# Patient Record
Sex: FEMALE
[De-identification: names, ages, dates, MRNs, and addresses within clinical notes are randomized per-mention and may not be internally consistent; named-entity substitution may affect disease eponyms.]

---

## 2017-06-26 PROBLEM — H93.13 TINNITUS, BILATERAL: Status: ACTIVE | Noted: 2017-06-26

## 2017-06-26 PROBLEM — H90.3 SENSORINEURAL HEARING LOSS (SNHL) OF BOTH EARS: Status: ACTIVE | Noted: 2017-06-26

## 2018-08-19 ENCOUNTER — HOSPITAL (OUTPATIENT)
Dept: OTHER | Age: 63
End: 2018-08-19
Attending: INTERNAL MEDICINE

## 2018-08-19 LAB
ALBUMIN SERPL-MCNC: 3.3 GM/DL (ref 3.6–5.1)
ALBUMIN/GLOB SERPL: 0.9 {RATIO} (ref 1–2.4)
ALP SERPL-CCNC: 88 UNIT/L (ref 45–117)
ALT SERPL-CCNC: 22 UNIT/L
ANALYZER ANC (IANC): NORMAL
ANION GAP SERPL CALC-SCNC: 9 MMOL/L (ref 10–20)
APTT PPP: 34 SECONDS (ref 22–30)
APTT PPP: ABNORMAL S
AST SERPL-CCNC: 18 UNIT/L
BASOPHILS # BLD: 0 THOUSAND/MCL (ref 0–0.3)
BASOPHILS NFR BLD: 1 %
BILIRUB SERPL-MCNC: 0.3 MG/DL (ref 0.2–1)
BUN SERPL-MCNC: 15 MG/DL (ref 6–20)
BUN/CREAT SERPL: 18 (ref 7–25)
CALCIUM SERPL-MCNC: 8.8 MG/DL (ref 8.4–10.2)
CHLORIDE: 105 MMOL/L (ref 98–107)
CO2 SERPL-SCNC: 28 MMOL/L (ref 21–32)
CREAT SERPL-MCNC: 0.83 MG/DL (ref 0.51–0.95)
DIFFERENTIAL METHOD BLD: NORMAL
EOSINOPHIL # BLD: 0.2 THOUSAND/MCL (ref 0.1–0.5)
EOSINOPHIL NFR BLD: 3 %
ERYTHROCYTE [DISTWIDTH] IN BLOOD: 14.2 % (ref 11–15)
GLOBULIN SER-MCNC: 3.8 GM/DL (ref 2–4)
GLUCOSE SERPL-MCNC: 82 MG/DL (ref 65–99)
HEMATOCRIT: 39.3 % (ref 36–46.5)
HGB BLD-MCNC: 12.7 GM/DL (ref 12–15.5)
INR PPP: 0.9
LYMPHOCYTES # BLD: 2.3 THOUSAND/MCL (ref 1–4)
LYMPHOCYTES NFR BLD: 29 %
MCH RBC QN AUTO: 30.2 PG (ref 26–34)
MCHC RBC AUTO-ENTMCNC: 32.3 GM/DL (ref 32–36.5)
MCV RBC AUTO: 93.6 FL (ref 78–100)
MONOCYTES # BLD: 0.7 THOUSAND/MCL (ref 0.3–0.9)
MONOCYTES NFR BLD: 9 %
NEUTROPHILS # BLD: 4.6 THOUSAND/MCL (ref 1.8–7.7)
NEUTROPHILS NFR BLD: 58 %
NEUTS SEG NFR BLD: NORMAL %
NRBC (NRBCRE): NORMAL
PLATELET # BLD: 227 THOUSAND/MCL (ref 140–450)
POTASSIUM SERPL-SCNC: 3.8 MMOL/L (ref 3.4–5.1)
PROT SERPL-MCNC: 7.1 GM/DL (ref 6.4–8.2)
PROTHROMBIN TIME: 9.7 SECONDS (ref 9.7–11.8)
PROTHROMBIN TIME: NORMAL
RBC # BLD: 4.2 MILLION/MCL (ref 4–5.2)
SODIUM SERPL-SCNC: 138 MMOL/L (ref 135–145)
WBC # BLD: 7.9 THOUSAND/MCL (ref 4.2–11)

## 2018-08-20 ENCOUNTER — CHARTING TRANS (OUTPATIENT)
Dept: OTHER | Age: 63
End: 2018-08-20

## 2018-08-20 ENCOUNTER — DIAGNOSTIC TRANS (OUTPATIENT)
Dept: OTHER | Age: 63
End: 2018-08-20

## 2018-08-20 LAB
ANALYZER ANC (IANC): NORMAL
ANION GAP SERPL CALC-SCNC: 12 MMOL/L (ref 10–20)
APTT PPP: 98 SECONDS (ref 22–30)
APTT PPP: >200 SECONDS (ref 22–30)
APTT PPP: ABNORMAL S
APTT PPP: ABNORMAL SECONDS (ref 22–30)
BASOPHILS # BLD: 0 THOUSAND/MCL (ref 0–0.3)
BASOPHILS NFR BLD: 1 %
BUN SERPL-MCNC: 16 MG/DL (ref 6–20)
BUN/CREAT SERPL: 21 (ref 7–25)
CALCIUM SERPL-MCNC: 8.6 MG/DL (ref 8.4–10.2)
CENTROMERE AB SER-ACNC: <0.2 AI (ref 0–0.9)
CHLORIDE: 108 MMOL/L (ref 98–107)
CHROMATIN AB SERPL-ACNC: <0.2 AI (ref 0–0.9)
CO2 SERPL-SCNC: 25 MMOL/L (ref 21–32)
CREAT SERPL-MCNC: 0.77 MG/DL (ref 0.51–0.95)
CRP SERPL-MCNC: 0.4 MG/DL
DIFFERENTIAL METHOD BLD: NORMAL
DSDNA AB SER IA-ACNC: 1 UNIT/ML
ENA JO1 IGG SER-ACNC: <0.2 AI (ref 0–0.9)
ENA RNP IGG SER IA-ACNC: <0.2 AI (ref 0–0.9)
ENA SCL70 IGG SER QL: 1.9 AI (ref 0–0.9)
ENA SM IGG SER IA-ACNC: <0.2 AI (ref 0–0.9)
ENA SM+RNP IGG SER IA-ACNC: <0.2 AI (ref 0–0.9)
ENA SS-A AB SER IA-ACNC: <0.2 AI (ref 0–0.9)
ENA SS-B IGG SER IA-ACNC: <0.2 AI (ref 0–0.9)
EOSINOPHIL # BLD: 0.3 THOUSAND/MCL (ref 0.1–0.5)
EOSINOPHIL NFR BLD: 4 %
ERYTHROCYTE [DISTWIDTH] IN BLOOD: 14.2 % (ref 11–15)
ERYTHROCYTE [SEDIMENTATION RATE] IN BLOOD BY WESTERGREN METHOD: 38 MM/HR (ref 0–20)
GLUCOSE SERPL-MCNC: 97 MG/DL (ref 65–99)
HEMATOCRIT: 37.4 % (ref 36–46.5)
HGB BLD-MCNC: 12 GM/DL (ref 12–15.5)
LYMPHOCYTES # BLD: 2.9 THOUSAND/MCL (ref 1–4)
LYMPHOCYTES NFR BLD: 36 %
MCH RBC QN AUTO: 29.9 PG (ref 26–34)
MCHC RBC AUTO-ENTMCNC: 32.1 GM/DL (ref 32–36.5)
MCV RBC AUTO: 93.3 FL (ref 78–100)
MONOCYTES # BLD: 0.8 THOUSAND/MCL (ref 0.3–0.9)
MONOCYTES NFR BLD: 10 %
NEUTROPHILS # BLD: 4.1 THOUSAND/MCL (ref 1.8–7.7)
NEUTROPHILS NFR BLD: 49 %
NEUTS SEG NFR BLD: NORMAL %
NRBC (NRBCRE): NORMAL
PLATELET # BLD: 203 THOUSAND/MCL (ref 140–450)
POTASSIUM SERPL-SCNC: 3.5 MMOL/L (ref 3.4–5.1)
RBC # BLD: 4.01 MILLION/MCL (ref 4–5.2)
RHEUMATOID FACT SER NEPH-ACNC: <10 UNIT/ML
RIBOSOMAL P IGG SER-ACNC: <0.2 AI (ref 0–0.9)
SODIUM SERPL-SCNC: 141 MMOL/L (ref 135–145)
WBC # BLD: 8.2 THOUSAND/MCL (ref 4.2–11)

## 2018-09-06 ENCOUNTER — DIAGNOSTIC TRANS (OUTPATIENT)
Dept: OTHER | Age: 63
End: 2018-09-06

## 2021-07-23 ENCOUNTER — HOSPITAL ENCOUNTER (OUTPATIENT)
Facility: CLINIC | Age: 66
Setting detail: OBSERVATION
Discharge: HOME | End: 2021-07-25
Attending: EMERGENCY MEDICINE | Admitting: EMERGENCY MEDICINE
Payer: COMMERCIAL

## 2021-07-23 DIAGNOSIS — I48.91 ATRIAL FIBRILLATION WITH RAPID VENTRICULAR RESPONSE (H): ICD-10-CM

## 2021-07-23 LAB
ALBUMIN SERPL-MCNC: 3.4 G/DL (ref 3.4–5)
ALP SERPL-CCNC: 86 U/L (ref 40–150)
ALT SERPL W P-5'-P-CCNC: 22 U/L (ref 0–50)
ANION GAP SERPL CALCULATED.3IONS-SCNC: 4 MMOL/L (ref 3–14)
AST SERPL W P-5'-P-CCNC: 12 U/L (ref 0–45)
BASOPHILS # BLD AUTO: 0.1 10E3/UL (ref 0–0.2)
BASOPHILS NFR BLD AUTO: 1 %
BILIRUB SERPL-MCNC: 0.5 MG/DL (ref 0.2–1.3)
BUN SERPL-MCNC: 17 MG/DL (ref 7–30)
CALCIUM SERPL-MCNC: 9.1 MG/DL (ref 8.5–10.1)
CHLORIDE BLD-SCNC: 109 MMOL/L (ref 94–109)
CO2 SERPL-SCNC: 26 MMOL/L (ref 20–32)
CREAT SERPL-MCNC: 0.79 MG/DL (ref 0.52–1.04)
EOSINOPHIL # BLD AUTO: 0.3 10E3/UL (ref 0–0.7)
EOSINOPHIL NFR BLD AUTO: 4 %
ERYTHROCYTE [DISTWIDTH] IN BLOOD BY AUTOMATED COUNT: 13.8 % (ref 10–15)
GFR SERPL CREATININE-BSD FRML MDRD: 80 ML/MIN/1.73M2
GLUCOSE BLD-MCNC: 100 MG/DL (ref 70–99)
HCT VFR BLD AUTO: 43.1 % (ref 35–47)
HGB BLD-MCNC: 13.9 G/DL (ref 11.7–15.7)
IMM GRANULOCYTES # BLD: 0 10E3/UL
IMM GRANULOCYTES NFR BLD: 0 %
LYMPHOCYTES # BLD AUTO: 2.2 10E3/UL (ref 0.8–5.3)
LYMPHOCYTES NFR BLD AUTO: 29 %
MAGNESIUM SERPL-MCNC: 2.3 MG/DL (ref 1.6–2.3)
MCH RBC QN AUTO: 29.6 PG (ref 26.5–33)
MCHC RBC AUTO-ENTMCNC: 32.3 G/DL (ref 31.5–36.5)
MCV RBC AUTO: 92 FL (ref 78–100)
MONOCYTES # BLD AUTO: 0.9 10E3/UL (ref 0–1.3)
MONOCYTES NFR BLD AUTO: 12 %
NEUTROPHILS # BLD AUTO: 4 10E3/UL (ref 1.6–8.3)
NEUTROPHILS NFR BLD AUTO: 54 %
NRBC # BLD AUTO: 0 10E3/UL
NRBC BLD AUTO-RTO: 0 /100
PLATELET # BLD AUTO: 258 10E3/UL (ref 150–450)
POTASSIUM BLD-SCNC: 4.4 MMOL/L (ref 3.4–5.3)
PROT SERPL-MCNC: 7.5 G/DL (ref 6.8–8.8)
RBC # BLD AUTO: 4.7 10E6/UL (ref 3.8–5.2)
SARS-COV-2 RNA RESP QL NAA+PROBE: NEGATIVE
SODIUM SERPL-SCNC: 139 MMOL/L (ref 133–144)
TROPONIN I SERPL-MCNC: <0.015 UG/L (ref 0–0.04)
TROPONIN I SERPL-MCNC: <0.015 UG/L (ref 0–0.04)
TSH SERPL DL<=0.005 MIU/L-ACNC: 1.08 MU/L (ref 0.4–4)
UFH PPP CHRO-ACNC: 0.77 IU/ML
WBC # BLD AUTO: 7.5 10E3/UL (ref 4–11)

## 2021-07-23 PROCEDURE — 85025 COMPLETE CBC W/AUTO DIFF WBC: CPT | Performed by: EMERGENCY MEDICINE

## 2021-07-23 PROCEDURE — 36415 COLL VENOUS BLD VENIPUNCTURE: CPT | Performed by: HOSPITALIST

## 2021-07-23 PROCEDURE — 85520 HEPARIN ASSAY: CPT | Performed by: HOSPITALIST

## 2021-07-23 PROCEDURE — 36415 COLL VENOUS BLD VENIPUNCTURE: CPT | Performed by: EMERGENCY MEDICINE

## 2021-07-23 PROCEDURE — 83735 ASSAY OF MAGNESIUM: CPT | Performed by: HOSPITALIST

## 2021-07-23 PROCEDURE — 84484 ASSAY OF TROPONIN QUANT: CPT | Performed by: HOSPITALIST

## 2021-07-23 PROCEDURE — 250N000009 HC RX 250: Performed by: EMERGENCY MEDICINE

## 2021-07-23 PROCEDURE — 96376 TX/PRO/DX INJ SAME DRUG ADON: CPT

## 2021-07-23 PROCEDURE — 96374 THER/PROPH/DIAG INJ IV PUSH: CPT

## 2021-07-23 PROCEDURE — 80053 COMPREHEN METABOLIC PANEL: CPT | Performed by: EMERGENCY MEDICINE

## 2021-07-23 PROCEDURE — G0378 HOSPITAL OBSERVATION PER HR: HCPCS

## 2021-07-23 PROCEDURE — 250N000013 HC RX MED GY IP 250 OP 250 PS 637: Performed by: HOSPITALIST

## 2021-07-23 PROCEDURE — 84484 ASSAY OF TROPONIN QUANT: CPT | Performed by: EMERGENCY MEDICINE

## 2021-07-23 PROCEDURE — 258N000003 HC RX IP 258 OP 636: Performed by: EMERGENCY MEDICINE

## 2021-07-23 PROCEDURE — 87635 SARS-COV-2 COVID-19 AMP PRB: CPT | Performed by: EMERGENCY MEDICINE

## 2021-07-23 PROCEDURE — 99285 EMERGENCY DEPT VISIT HI MDM: CPT | Mod: 25

## 2021-07-23 PROCEDURE — 250N000011 HC RX IP 250 OP 636: Performed by: EMERGENCY MEDICINE

## 2021-07-23 PROCEDURE — 96375 TX/PRO/DX INJ NEW DRUG ADDON: CPT

## 2021-07-23 PROCEDURE — 96368 THER/DIAG CONCURRENT INF: CPT

## 2021-07-23 PROCEDURE — 84443 ASSAY THYROID STIM HORMONE: CPT | Performed by: HOSPITALIST

## 2021-07-23 PROCEDURE — 99220 PR INITIAL OBSERVATION CARE,LEVEL III: CPT | Performed by: HOSPITALIST

## 2021-07-23 PROCEDURE — 93005 ELECTROCARDIOGRAM TRACING: CPT

## 2021-07-23 PROCEDURE — 96366 THER/PROPH/DIAG IV INF ADDON: CPT

## 2021-07-23 PROCEDURE — C9803 HOPD COVID-19 SPEC COLLECT: HCPCS

## 2021-07-23 PROCEDURE — 96365 THER/PROPH/DIAG IV INF INIT: CPT

## 2021-07-23 RX ORDER — CETIRIZINE HYDROCHLORIDE 10 MG/1
10 TABLET ORAL DAILY
COMMUNITY

## 2021-07-23 RX ORDER — NALOXONE HYDROCHLORIDE 0.4 MG/ML
0.2 INJECTION, SOLUTION INTRAMUSCULAR; INTRAVENOUS; SUBCUTANEOUS
Status: DISCONTINUED | OUTPATIENT
Start: 2021-07-23 | End: 2021-07-25 | Stop reason: HOSPADM

## 2021-07-23 RX ORDER — CHLORAL HYDRATE 500 MG
1 CAPSULE ORAL DAILY
COMMUNITY

## 2021-07-23 RX ORDER — POLYETHYLENE GLYCOL 3350 17 G/17G
17 POWDER, FOR SOLUTION ORAL DAILY PRN
Status: DISCONTINUED | OUTPATIENT
Start: 2021-07-23 | End: 2021-07-25 | Stop reason: HOSPADM

## 2021-07-23 RX ORDER — ASPIRIN 325 MG/1
325 TABLET, FILM COATED ORAL DAILY
Status: DISCONTINUED | OUTPATIENT
Start: 2021-07-23 | End: 2021-07-24

## 2021-07-23 RX ORDER — ACETAMINOPHEN 650 MG/1
650 SUPPOSITORY RECTAL EVERY 6 HOURS PRN
Status: DISCONTINUED | OUTPATIENT
Start: 2021-07-23 | End: 2021-07-25 | Stop reason: HOSPADM

## 2021-07-23 RX ORDER — NALOXONE HYDROCHLORIDE 0.4 MG/ML
0.4 INJECTION, SOLUTION INTRAMUSCULAR; INTRAVENOUS; SUBCUTANEOUS
Status: DISCONTINUED | OUTPATIENT
Start: 2021-07-23 | End: 2021-07-25 | Stop reason: HOSPADM

## 2021-07-23 RX ORDER — ONDANSETRON 4 MG/1
4 TABLET, ORALLY DISINTEGRATING ORAL EVERY 6 HOURS PRN
Status: DISCONTINUED | OUTPATIENT
Start: 2021-07-23 | End: 2021-07-25 | Stop reason: HOSPADM

## 2021-07-23 RX ORDER — SODIUM CHLORIDE 9 MG/ML
INJECTION, SOLUTION INTRAVENOUS CONTINUOUS
Status: DISCONTINUED | OUTPATIENT
Start: 2021-07-23 | End: 2021-07-23

## 2021-07-23 RX ORDER — HEPARIN SODIUM 10000 [USP'U]/100ML
0-5000 INJECTION, SOLUTION INTRAVENOUS CONTINUOUS
Status: DISCONTINUED | OUTPATIENT
Start: 2021-07-23 | End: 2021-07-23

## 2021-07-23 RX ORDER — METOPROLOL TARTRATE 1 MG/ML
5 INJECTION, SOLUTION INTRAVENOUS
Status: COMPLETED | OUTPATIENT
Start: 2021-07-23 | End: 2021-07-23

## 2021-07-23 RX ORDER — OMEPRAZOLE 40 MG/1
40 CAPSULE, DELAYED RELEASE ORAL DAILY
COMMUNITY

## 2021-07-23 RX ORDER — ACETAMINOPHEN 325 MG/1
650 TABLET ORAL EVERY 6 HOURS PRN
Status: DISCONTINUED | OUTPATIENT
Start: 2021-07-23 | End: 2021-07-25 | Stop reason: HOSPADM

## 2021-07-23 RX ORDER — OXYCODONE HYDROCHLORIDE 5 MG/1
5 TABLET ORAL EVERY 4 HOURS PRN
Status: DISCONTINUED | OUTPATIENT
Start: 2021-07-23 | End: 2021-07-25 | Stop reason: HOSPADM

## 2021-07-23 RX ORDER — ASPIRIN 325 MG/1
325 TABLET, FILM COATED ORAL DAILY
Status: ON HOLD | COMMUNITY
End: 2021-07-25

## 2021-07-23 RX ORDER — HEPARIN SODIUM 10000 [USP'U]/100ML
0-5000 INJECTION, SOLUTION INTRAVENOUS CONTINUOUS
Status: DISCONTINUED | OUTPATIENT
Start: 2021-07-23 | End: 2021-07-24

## 2021-07-23 RX ORDER — ONDANSETRON 2 MG/ML
4 INJECTION INTRAMUSCULAR; INTRAVENOUS EVERY 6 HOURS PRN
Status: DISCONTINUED | OUTPATIENT
Start: 2021-07-23 | End: 2021-07-25 | Stop reason: HOSPADM

## 2021-07-23 RX ORDER — SPIRONOLACTONE 50 MG/1
50 TABLET, FILM COATED ORAL DAILY
Status: ON HOLD | COMMUNITY
End: 2021-07-25

## 2021-07-23 RX ADMIN — METOPROLOL TARTRATE 5 MG: 5 INJECTION INTRAVENOUS at 12:55

## 2021-07-23 RX ADMIN — HEPARIN SODIUM 1200 UNITS/HR: 10000 INJECTION, SOLUTION INTRAVENOUS at 14:32

## 2021-07-23 RX ADMIN — SODIUM CHLORIDE 1000 ML: 9 INJECTION, SOLUTION INTRAVENOUS at 11:47

## 2021-07-23 RX ADMIN — ASPIRIN 325 MG: 325 TABLET, FILM COATED ORAL at 20:27

## 2021-07-23 RX ADMIN — DILTIAZEM HYDROCHLORIDE 5 MG/HR: 5 INJECTION INTRAVENOUS at 14:44

## 2021-07-23 RX ADMIN — METOPROLOL TARTRATE 5 MG: 5 INJECTION INTRAVENOUS at 11:56

## 2021-07-23 RX ADMIN — METOPROLOL TARTRATE 5 MG: 5 INJECTION INTRAVENOUS at 12:35

## 2021-07-23 RX ADMIN — OMEPRAZOLE 40 MG: 20 CAPSULE, DELAYED RELEASE ORAL at 20:27

## 2021-07-23 ASSESSMENT — ENCOUNTER SYMPTOMS: PALPITATIONS: 1

## 2021-07-23 NOTE — ED NOTES
[facility]  ED Nurse Handoff Report    ED Chief complaint: Palpitations      ED Diagnosis:   Final diagnoses:   Atrial fibrillation with rapid ventricular response (H)       Code Status: see prior    Allergies:   Allergies   Allergen Reactions     Penicillins        Patient Story: Pt here visiting family from [place] and developed palpitations this AM. Pt states she has a hx of afib.   Focused Assessment:  Aox4. Initial Hr 145 and now 108 after IV metoprolol x3. Dilt GTT will be started. Trop Neg. EKG Neg for STEMI. Afib on monitor.    Treatments and/or interventions provided: SEE MAR, HR lower however still in AFIB.  Patient's response to treatments and/or interventions:     To be done/followed up on inpatient unit: n/a    Does this patient have any cognitive concerns?:no    Activity level - Baseline/Home:  Independent  Activity Level - Current:   Independent    Patient's Preferred language: English   Needed?: No    Isolation: None  Infection: Not Applicable  Patient tested for COVID 19 prior to admission: YES  Bariatric?: No    Vital Signs:   Vitals:    07/23/21 1143 07/23/21 1229 07/23/21 1248 07/23/21 1337   BP: (!) 143/119 134/75 (!) 126/96    Pulse: (!) 124 (!) 124 118    Resp: 19 14 17    Temp:       TempSrc:       SpO2: 98% 97% 97%    Weight:    109.7 kg (241 lb 12.8 oz)       Cardiac Rhythm:     Was the PSS-3 completed:   Yes  What interventions are required if any?               Family Comments: Spouse at bedside  OBS brochure/video discussed/provided to patient/family: N/A              Name of person given brochure if not patient: na              Relationship to patient: na    For the majority of the shift this patient's behavior was Green.   Behavioral interventions performed were na.    ED NURSE PHONE NUMBER: [id number]

## 2021-07-23 NOTE — ED TRIAGE NOTES
Very irregular heart beat for the last couple of hours. Hx of afib but usually only last a couple of seconds.

## 2021-07-23 NOTE — H&P
[facility]    History and Physical  Hospitalist    Ronel Li MRN# [id number]   Age: 65 year old YOB: 1955     Date of Admission:  7/23/2021    Primary care provider: No Ref-Primary, Physician          Assessment and Plan:     Ronel Li is a 65 year old  female with medical history of paroxysmal atrial fibrillation not on anticoagulation, asymptomatic SVT on Holter, lupus anticoagulant, statin intolerance, GERD presented to the ED with the palpitation.    Symptomatic paroxysmal atrial fibrillation with RVR  Paroxysmal atrial fibrillation not on anticoagulation..  History of SVT.  Patient reports on and off palpitations from her atrial fibrillation, approximately 1-2 times a month.  This morning was working out, noted that her Apple Watch indicated a heart rate that was going up and down.  Associated palpitations prompting her to seek medical attention. Reports from [place], in [facility] to celebrate her mother's 90th birthday.   Has Established cardiac care in [place]. Holter 10/25/2018 - Atrial runs were noted in the form of supraventricular tachycardia. Echo 10/25/2018 -EF 63%.  Trivial to mild mitral valve regurgitation.  ED afebrile, blood pressure 125/90, heart rate 128.  Irregularly irregular heartbeat.  WBC 7.5, glucose 100, troponin undetectable.  EKG A. fib with RVR, heart rate 128.  Admit to observation unit.  Telemetry monitoring.  Trend troponin.  Echocardiogram ordered.  Will start on IV Cardizem drip, received 2 doses of IV metoprolol in ED.  Start on IV heparin drip.  Continue PTA aspirin 325 mg oral daily.  Check TSH, magnesium levels.  Keep potassium around 4, magnesium around 2.  Cardiology consult requested.    Hypertension  Hold PTA spironolactone.  On IV Cardizem drip as above.    Hyperlipidemia.  LDL of 144 in April 2021 on review of records in Care Everywhere.  History of intolerance to statin therapy.  Defer care to PCP, primary  cardiologist.  Consider lifestyle modification, hold fish oil supplements while under observation status.    Moderate - severe alcohol use.   Does admit to drinking a little more than her usual since the beginning of the pandemic.  Reports drinks 1 to 2 glasses of wine every day, 4 to 5 glasses of wine over the weekend.  Last night had drank a full bottle of wine.   Discussed to consider cutting down to 1 alcoholic drink per day.  Is motivated to cut down     History of lupus anticoagulant, scleroderma antibody positive.  Follows up with rheumatologist in [place].  No acute issues  Continue PTA aspirin 325 mg oral daily.    Gastroesophageal reflux disease.  Continue PTA omeprazole.    Obesity with a BMI greater than 30.  Consider lifestyle modification with diet and exercise as able to.  Consider sleep studies as outpatient.    Rule Out COVID-19 infection  Follow-up COVID-19 PCR test result    DVT Prophylaxis: SCD, on IV heparin.  Code Status: Full code, discussed with patient.     Disposition: Expected discharge in 1 to 2 days pending clinical improvement.  More than 60% of time spent in direct patient care, care coordination, patient counseling, and formalizing plan of care. Discussed with patient and ED team.     Kassandra Mccauley MD          Chief Complaint:     History is obtained from patient, review of medical records.    Ronel Li is a 65 year old  female with medical history of paroxysmal atrial fibrillation not on anticoagulation, asymptomatic SVT on Holter, lupus anticoagulant, statin intolerance, GERD presented to the ED with the palpitation.    Patient reports on and off palpitations from her atrial fibrillation, approximately 1-2 times a month.  This morning was working out, noted that her Apple Watch indicated a heart rate that was going up and down.  Associated palpitations prompting her to seek medical attention. Reports from [place], in town to celebrate her mother's 90th birthday.   Denies  any chest pain or shortness of breath.  Denies any cough or wheezing.  Denies any fever or chills.  Denies any headache or dizziness.  Denies any tingling or numbness.  Denies any focal weakness.  Denies any abdominal pain or diarrhea or constipation.  Denies any coughing up blood or blood in the stools or blood in the urine.  Does admit to drinking a little more than her usual since the beginning of the pandemic.  Reports drinks 1 to 2 glasses of wine every day, 4 to 5 glasses of wine over the weekend.  Last night had drank a full bottle of wine.   ED afebrile, blood pressure 125/90, heart rate 128.  Irregularly irregular heartbeat.  WBC 7.5, glucose 100, troponin undetectable.  EKG A. fib with RVR, heart rate 128.         Review of Systems:     GENERAL:  no fever or chills  EENT:  no difficulty swallowing, no hearing difficulty  PULMONARY: No shortness of breath, no cough, no wheezing  CARDIAC: no chest pain  GI: No abdominal pain, nausea, vomiting, diarrhea, constipation, black or bloody stools  : No burning/pain with urination  NEURO: No significant headaches, no dizziness  ENDOCRINE: No excessive thirst, no excessive bruising.  MUSCULOSKELETAL: No joint pain  SKIN: No skin rashes  PSYCHIATRY no new anxiety    Medical History:   paroxysmal atrial fibrillation not on anticoagulation, asymptomatic SVT on Holter, lupus anticoagulant, statin intolerance, GERD    Surgical History:   Surgical history reviewed, no pertinent surgical history to [facility].         Social History:      Former smoker.  Daily alcohol use.Does admit to drinking a little more than her usual since the beginning of the pandemic.  Reports drinks 1 to 2 glasses of wine every day, 4 to 5 glasses of wine over the weekend.  Last night had drank a full bottle of wine.   , lives at home with her .  Working remotely.         Family History:     Family history reviewed, no pertinent family history to [facility].         Allergies:     Allergies    Allergen Reactions     Penicillins              Medications:   Home medications reviewed.         Physical Exam      Admission Weight: 109.7 kg (241 lb 12.8 oz)  Current Weight: 109.7 kg (241 lb 12.8 oz)    Vital Signs with Ranges  Temp:  [98  F (36.7  C)] 98  F (36.7  C)  Pulse:  [118-128] 118  Resp:  [14-19] 17  BP: (125-143)/() 126/96  SpO2:  [97 %-98 %] 97 %  No intake or output data in the 24 hours ending 07/23/21 1348    PHYSICAL EXAM  GENERAL: Patient is in no distress. Alert and oriented.  HEENT: Oropharynx pink, moist.   HEART: irregular rate and rhythm. S1S2. No murmurs tachycardia   LUNGS: Clear to auscultation bilaterally. No expiratory wheeze.  Respirations unlabored  ABDOMEN: Soft, no abdominal tenderness, bowel sounds heard   NEURO: moving all extremities, no focal weakness.  EXTREMITIES: No pedal edema. 2+ peripheral pulses.  SKIN: Warm, dry. No rash or bruising.  PSYCHIATRY Cooperative         Data:   All new lab and imaging data was reviewed.

## 2021-07-23 NOTE — ED PROVIDER NOTES
History   Chief Complaint:  Palpitations       HPI   Ronel Li is a 65 year old female with history of Atrial Fibrillation who presents with palpitations since this morning. The patient work up this morning feeling her heart racing. Her apple watch indicated that her heart rate was going up and down since 0600. She has been diagnosed with A fib and has had a heart murmur ever since she was little. She felt thirst last night but drank a bottle of wine instead of water. She drinks alcohol on a regular basis. She is visiting her mother and is from [place].     Review of Systems   Cardiovascular: Positive for palpitations.   All other systems reviewed and are negative.        Allergies:  Penicillins    Medications:  Zyrtec  Spiralactone  Prilosec  Asprin 325  Fish oil    Past Medical History:    Atrial fibrillation  Heart murmur    Social History:  The patient presents with .   From [place].     Physical Exam     Patient Vitals for the past 24 hrs:   BP Temp Temp src Pulse Resp SpO2   07/23/21 1248 (!) 126/96 -- -- 118 17 97 %   07/23/21 1229 134/75 -- -- (!) 124 14 97 %   07/23/21 1143 (!) 143/119 -- -- (!) 124 19 98 %   07/23/21 1101 -- -- -- (!) 128 -- --   07/23/21 1053 (!) 125/90 98  F (36.7  C) Temporal -- 16 98 %       Physical Exam  GENERAL: well developed, pleasant  HEAD: atraumatic  EYES: pupils reactive, extraocular muscles intact, conjunctivae normal  ENT:  mucus membranes moist  NECK:  trachea midline, normal range of motion  RESPIRATORY: no tachypnea, breath sounds clear to auscultation   CVS: normal S1/S2, no murmurs, intact distal pulses Irregular heartrate  ABDOMEN: soft, nontender, nondistention  MUSCULOSKELETAL: no deformities  SKIN: warm and dry, no acute rashes or ulceration  NEURO: GCS 15, cranial nerves intact, alert and oriented x3  PSYCH:  Mood/affect normal    Emergency Department Course   ECG  ECG taken at 1059, ECG read at 1102  Atrial fibrillation with rapid ventricular  response  Possible anterior infarct, age undetermined  ST and T wave abnormality, consider lateral ischemia   Rate 128 bpm. IL interval * ms. QRS duration 114 ms. QT/QTc 330/481 ms. P-R-T axes * -1 124.     Laboratory:   CBC: WBC 7.5, HGB 13.9,      CMP: Glucose: 100 (H) o/w WNL (Creatinine 0.79)     Troponin (Collected 1150): <0.015    Emergency Department Course:    Reviewed:  I reviewed nursing notes, vitals, past medical history and care everywhere    Assessments:  1117 I obtained history and examined the patient as noted above.     1328 I rechecked the patient and explained findings.     Consults:   1340 I spoke with Dr. Mccauley, hospitalist, who agreed to admit the patient.    1348 I spoke with Cardiology, regarding the patient.    Interventions:  1147 Bolus 1,000 mL IV    1235 Lopressor 5 mg IV    1156 Lopressor 5 mg IV    Disposition:  The patient was admitted to the hospital under the care of Dr. Mccauley.       Impression & Plan     Medical Decision Making:    Patient presents with fast heart rate and history of paroxysmal atrial fibrillation.  She is not anticoagulated nor is she on rate control prevention.  She notes since the pandemic she has been drinking more alcohol.  She had full bottle of wine last night.  She is visiting from out of town.  She notes she has been in and out of A. fib or what she felt like she has been in and out this past month may be 2 or 3 times.  Normally she goes out of it but this time she persisted.  Patient takes full dose aspirin.  Patient was given IV fluids and metoprolol x3.  She still having ongoing A. fib.  Cardizem drip and heparin was started.  This figure the hospitalist regarding admission who requested consult with cardiology regarding possible cardioversion.  Cardioversion was not suggested.  Patient be admitted for ongoing work-up and treatment.      Diagnosis:    ICD-10-CM    1. Atrial fibrillation with rapid ventricular response (H)  I48.91         Scribe Disclosure:  I, Reese Joslyn, am serving as a scribe at 11:19 AM on 7/23/2021 to document services personally performed by Jeff Mancia MD based on my observations and the provider's statements to me.            Jeff Mancia MD  07/23/21 3620

## 2021-07-23 NOTE — PHARMACY-ADMISSION MEDICATION HISTORY
Pharmacy Medication History  Admission medication history interview status for the 7/23/2021  admission is complete. See EPIC admission navigator for prior to admission medications     Location of Interview: Patient room  Medication history sources: Patient, Patient's home med list and Care Everywhere    Significant changes made to the medication list:  Added entire med list    In the past week, patient estimated taking medication this percent of the time: greater than 90%    Additional medication history information:   None    Medication reconciliation completed by provider prior to medication history? No    Time spent in this activity: 15 mins    Prior to Admission medications    Medication Sig Last Dose Taking? Auth Provider   aspirin - buffered (ASCRIPTIN) 325 MG TABS tablet Take 325 mg by mouth daily 7/22/2021 at AM Yes Unknown, Entered By History   cetirizine (ZYRTEC) 10 MG tablet Take 10 mg by mouth daily 7/22/2021 at AM Yes Unknown, Entered By History   fish oil-omega-3 fatty acids 1000 MG capsule Take 1 g by mouth daily 7/22/2021 at AM Yes Unknown, Entered By History   omeprazole (PRILOSEC) 40 MG DR capsule Take 40 mg by mouth daily 7/22/2021 at AM Yes Unknown, Entered By History   spironolactone (ALDACTONE) 50 MG tablet Take 50 mg by mouth daily 7/22/2021 at AM Yes Unknown, Entered By History       The information provided in this note is only as accurate as the sources available at the time of update(s)     Xiao Serrano, RadhaD

## 2021-07-24 ENCOUNTER — APPOINTMENT (OUTPATIENT)
Dept: CT IMAGING | Facility: CLINIC | Age: 66
End: 2021-07-24
Attending: NURSE PRACTITIONER
Payer: COMMERCIAL

## 2021-07-24 ENCOUNTER — APPOINTMENT (OUTPATIENT)
Dept: CARDIOLOGY | Facility: CLINIC | Age: 66
End: 2021-07-24
Attending: HOSPITALIST
Payer: COMMERCIAL

## 2021-07-24 LAB
ANION GAP SERPL CALCULATED.3IONS-SCNC: 6 MMOL/L (ref 3–14)
ATRIAL RATE - MUSE: 107 BPM
BASOPHILS # BLD AUTO: 0.1 10E3/UL (ref 0–0.2)
BASOPHILS NFR BLD AUTO: 1 %
BUN SERPL-MCNC: 13 MG/DL (ref 7–30)
CALCIUM SERPL-MCNC: 9.2 MG/DL (ref 8.5–10.1)
CHLORIDE BLD-SCNC: 111 MMOL/L (ref 94–109)
CO2 SERPL-SCNC: 23 MMOL/L (ref 20–32)
CREAT SERPL-MCNC: 0.76 MG/DL (ref 0.52–1.04)
D DIMER PPP FEU-MCNC: <0.27 UG/ML FEU (ref 0–0.5)
DIASTOLIC BLOOD PRESSURE - MUSE: NORMAL MMHG
EOSINOPHIL # BLD AUTO: 0.3 10E3/UL (ref 0–0.7)
EOSINOPHIL NFR BLD AUTO: 4 %
ERYTHROCYTE [DISTWIDTH] IN BLOOD BY AUTOMATED COUNT: 14.1 % (ref 10–15)
GFR SERPL CREATININE-BSD FRML MDRD: 84 ML/MIN/1.73M2
GLUCOSE BLD-MCNC: 99 MG/DL (ref 70–99)
HCT VFR BLD AUTO: 42.6 % (ref 35–47)
HGB BLD-MCNC: 14.1 G/DL (ref 11.7–15.7)
HOLD SPECIMEN: NORMAL
IMM GRANULOCYTES # BLD: 0 10E3/UL
IMM GRANULOCYTES NFR BLD: 0 %
INTERPRETATION ECG - MUSE: NORMAL
LVEF ECHO: NORMAL
LYMPHOCYTES # BLD AUTO: 2.4 10E3/UL (ref 0.8–5.3)
LYMPHOCYTES NFR BLD AUTO: 25 %
MCH RBC QN AUTO: 30.3 PG (ref 26.5–33)
MCHC RBC AUTO-ENTMCNC: 33.1 G/DL (ref 31.5–36.5)
MCV RBC AUTO: 91 FL (ref 78–100)
MONOCYTES # BLD AUTO: 0.7 10E3/UL (ref 0–1.3)
MONOCYTES NFR BLD AUTO: 8 %
NEUTROPHILS # BLD AUTO: 6.1 10E3/UL (ref 1.6–8.3)
NEUTROPHILS NFR BLD AUTO: 62 %
NRBC # BLD AUTO: 0 10E3/UL
NRBC BLD AUTO-RTO: 0 /100
NT-PROBNP SERPL-MCNC: 1423 PG/ML (ref 0–900)
P AXIS - MUSE: NORMAL DEGREES
PLATELET # BLD AUTO: 225 10E3/UL (ref 150–450)
POTASSIUM BLD-SCNC: 3.7 MMOL/L (ref 3.4–5.3)
PR INTERVAL - MUSE: NORMAL MS
QRS DURATION - MUSE: 114 MS
QT - MUSE: 330 MS
QTC - MUSE: 481 MS
R AXIS - MUSE: -1 DEGREES
RBC # BLD AUTO: 4.66 10E6/UL (ref 3.8–5.2)
SODIUM SERPL-SCNC: 140 MMOL/L (ref 133–144)
SYSTOLIC BLOOD PRESSURE - MUSE: NORMAL MMHG
T AXIS - MUSE: 124 DEGREES
TROPONIN I SERPL-MCNC: <0.015 UG/L (ref 0–0.04)
UFH PPP CHRO-ACNC: 0.35 IU/ML
UFH PPP CHRO-ACNC: 0.42 IU/ML
VENTRICULAR RATE- MUSE: 128 BPM
WBC # BLD AUTO: 9.7 10E3/UL (ref 4–11)

## 2021-07-24 PROCEDURE — 85520 HEPARIN ASSAY: CPT | Performed by: INTERNAL MEDICINE

## 2021-07-24 PROCEDURE — 84484 ASSAY OF TROPONIN QUANT: CPT | Performed by: INTERNAL MEDICINE

## 2021-07-24 PROCEDURE — 84484 ASSAY OF TROPONIN QUANT: CPT | Mod: 91 | Performed by: NURSE PRACTITIONER

## 2021-07-24 PROCEDURE — G0378 HOSPITAL OBSERVATION PER HR: HCPCS

## 2021-07-24 PROCEDURE — 96375 TX/PRO/DX INJ NEW DRUG ADDON: CPT

## 2021-07-24 PROCEDURE — 36415 COLL VENOUS BLD VENIPUNCTURE: CPT | Performed by: HOSPITALIST

## 2021-07-24 PROCEDURE — 258N000003 HC RX IP 258 OP 636: Performed by: HOSPITALIST

## 2021-07-24 PROCEDURE — 93306 TTE W/DOPPLER COMPLETE: CPT | Mod: 26 | Performed by: INTERNAL MEDICINE

## 2021-07-24 PROCEDURE — 250N000013 HC RX MED GY IP 250 OP 250 PS 637: Performed by: HOSPITALIST

## 2021-07-24 PROCEDURE — 250N000011 HC RX IP 250 OP 636: Performed by: NURSE PRACTITIONER

## 2021-07-24 PROCEDURE — 250N000013 HC RX MED GY IP 250 OP 250 PS 637: Performed by: STUDENT IN AN ORGANIZED HEALTH CARE EDUCATION/TRAINING PROGRAM

## 2021-07-24 PROCEDURE — 85520 HEPARIN ASSAY: CPT | Performed by: HOSPITALIST

## 2021-07-24 PROCEDURE — 85004 AUTOMATED DIFF WBC COUNT: CPT | Performed by: HOSPITALIST

## 2021-07-24 PROCEDURE — 83880 ASSAY OF NATRIURETIC PEPTIDE: CPT | Performed by: NURSE PRACTITIONER

## 2021-07-24 PROCEDURE — 96366 THER/PROPH/DIAG IV INF ADDON: CPT

## 2021-07-24 PROCEDURE — 250N000013 HC RX MED GY IP 250 OP 250 PS 637: Performed by: INTERNAL MEDICINE

## 2021-07-24 PROCEDURE — 84484 ASSAY OF TROPONIN QUANT: CPT | Performed by: HOSPITALIST

## 2021-07-24 PROCEDURE — 250N000011 HC RX IP 250 OP 636: Performed by: HOSPITALIST

## 2021-07-24 PROCEDURE — 36415 COLL VENOUS BLD VENIPUNCTURE: CPT | Performed by: INTERNAL MEDICINE

## 2021-07-24 PROCEDURE — 250N000009 HC RX 250: Performed by: HOSPITALIST

## 2021-07-24 PROCEDURE — 99225 PR SUBSEQUENT OBSERVATION CARE,LEVEL II: CPT | Performed by: INTERNAL MEDICINE

## 2021-07-24 PROCEDURE — 80048 BASIC METABOLIC PNL TOTAL CA: CPT | Performed by: HOSPITALIST

## 2021-07-24 PROCEDURE — 85379 FIBRIN DEGRADATION QUANT: CPT | Performed by: NURSE PRACTITIONER

## 2021-07-24 PROCEDURE — 36415 COLL VENOUS BLD VENIPUNCTURE: CPT | Performed by: NURSE PRACTITIONER

## 2021-07-24 PROCEDURE — 93306 TTE W/DOPPLER COMPLETE: CPT

## 2021-07-24 PROCEDURE — 99203 OFFICE O/P NEW LOW 30 MIN: CPT | Mod: 25 | Performed by: INTERNAL MEDICINE

## 2021-07-24 PROCEDURE — 71275 CT ANGIOGRAPHY CHEST: CPT

## 2021-07-24 PROCEDURE — 99207 PR APP CREDIT; MD BILLING SHARED VISIT: CPT | Performed by: NURSE PRACTITIONER

## 2021-07-24 PROCEDURE — 99207 PR CDG-CODE CATEGORY CHANGED: CPT | Performed by: INTERNAL MEDICINE

## 2021-07-24 PROCEDURE — 93005 ELECTROCARDIOGRAM TRACING: CPT

## 2021-07-24 RX ORDER — METOPROLOL TARTRATE 25 MG/1
25 TABLET, FILM COATED ORAL 2 TIMES DAILY
Status: DISCONTINUED | OUTPATIENT
Start: 2021-07-24 | End: 2021-07-25 | Stop reason: HOSPADM

## 2021-07-24 RX ORDER — NITROGLYCERIN 0.4 MG/1
0.4 TABLET SUBLINGUAL EVERY 5 MIN PRN
Status: DISCONTINUED | OUTPATIENT
Start: 2021-07-24 | End: 2021-07-25 | Stop reason: HOSPADM

## 2021-07-24 RX ORDER — IOPAMIDOL 755 MG/ML
79 INJECTION, SOLUTION INTRAVASCULAR ONCE
Status: COMPLETED | OUTPATIENT
Start: 2021-07-24 | End: 2021-07-24

## 2021-07-24 RX ORDER — FUROSEMIDE 10 MG/ML
40 INJECTION INTRAMUSCULAR; INTRAVENOUS ONCE
Status: COMPLETED | OUTPATIENT
Start: 2021-07-24 | End: 2021-07-24

## 2021-07-24 RX ORDER — NITROGLYCERIN 0.4 MG/1
TABLET SUBLINGUAL
Status: DISPENSED
Start: 2021-07-24 | End: 2021-07-24

## 2021-07-24 RX ADMIN — METOPROLOL TARTRATE 25 MG: 25 TABLET, FILM COATED ORAL at 08:29

## 2021-07-24 RX ADMIN — HEPARIN SODIUM 900 UNITS/HR: 10000 INJECTION, SOLUTION INTRAVENOUS at 09:59

## 2021-07-24 RX ADMIN — OMEPRAZOLE 40 MG: 20 CAPSULE, DELAYED RELEASE ORAL at 08:29

## 2021-07-24 RX ADMIN — SODIUM CHLORIDE 100 ML: 9 INJECTION, SOLUTION INTRAVENOUS at 06:29

## 2021-07-24 RX ADMIN — FUROSEMIDE 40 MG: 10 INJECTION, SOLUTION INTRAVENOUS at 08:28

## 2021-07-24 RX ADMIN — ACETAMINOPHEN 650 MG: 325 TABLET, FILM COATED ORAL at 20:05

## 2021-07-24 RX ADMIN — NITROGLYCERIN 0.4 MG: 0.4 TABLET SUBLINGUAL at 05:59

## 2021-07-24 RX ADMIN — NITROGLYCERIN 0.4 MG: 0.4 TABLET SUBLINGUAL at 05:50

## 2021-07-24 RX ADMIN — IOPAMIDOL 79 ML: 755 INJECTION, SOLUTION INTRAVENOUS at 06:29

## 2021-07-24 RX ADMIN — ASPIRIN 325 MG: 325 TABLET, FILM COATED ORAL at 08:29

## 2021-07-24 RX ADMIN — DILTIAZEM HYDROCHLORIDE 10 MG/HR: 5 INJECTION INTRAVENOUS at 05:27

## 2021-07-24 RX ADMIN — METOPROLOL TARTRATE 25 MG: 25 TABLET, FILM COATED ORAL at 20:02

## 2021-07-24 RX ADMIN — APIXABAN 5 MG: 5 TABLET, FILM COATED ORAL at 17:04

## 2021-07-24 NOTE — PLAN OF CARE
A&O x4. Pt reports mild chest discomfort and that she can't take a deep breath, pt was given and IS and instructed on use, pt stated improvement after use. VSS, on RA. Pt converted to SR around 0820.  Up IND. Tele: SR. Transitioned to Eliquis. Plan for discharge tomorrow on eliquis and metoprolol. Continue to monitor.

## 2021-07-24 NOTE — PROGRESS NOTES
[facility] Internal Medicine Progress Note     Date of Service (when I saw the patient): 07/24/2021    REASON FOR ADMISSION / INTERVAL HISTORY:  Ronel Li is a 65 year old  female with medical history of paroxysmal atrial fibrillation not on anticoagulation, asymptomatic SVT on Holter, lupus anticoagulant, statin intolerance, GERD presented to the ED on 7/23 with  Palpitation.   In SR now-just anxious of it coming back.     ASSESSMENT/PLAN:     Symptomatic paroxysmal atrial fibrillation with RVR  Patient reports on and off palpitations from her atrial fibrillation, approximately 1-2 times a month.  This morning was working out, noted that her Apple Watch indicated a heart rate that was going up and down.  Associated palpitations prompting her to seek medical attention. Reports from [place], in [facility] to celebrate her mother's 90th birthday.   Has Established cardiac care in [place]. Holter 10/25/2018 - Atrial runs were noted in the form of supraventricular tachycardia. Echo 10/25/2018 -EF 63%.  Trivial to mild mitral valve regurgitation.  Was started  on IV Cardizem/ heparin drip, received 2 doses of IV metoprolol in ED.  Stopped cardizem gtt and started po metoprolol this AM. Pt converted to SR and is in rate of 60-70s now. Echocardiogram:  Normal LV function.  EF of 55-60%.  No significant valve disease.    Cards seen. Chads vas score of 2. eliquis started/stopped heparin  -continue metoprolol/ eliquis.     Hypertension  Stable  -continue metoprolol. Continue to Hold PTA spironolactone.     Hyperlipidemia.  LDL of 144 in April 2021 on review of records in Care Everywhere.  History of intolerance to statin therapy.  Defer care to PCP, primary cardiologist.  Consider lifestyle modification, hold fish oil supplements while under observation status.     Moderate - severe alcohol use.   Does admit to drinking a little more than her usual since the beginning of the pandemic.  Reports drinks 1 to 2 glasses of  wine every day, 4 to 5 glasses of wine over the weekend.  Last night had drank a full bottle of wine.   Discussed to consider cutting down to 1 alcoholic drink per day.  Is motivated to cut down      History of lupus anticoagulant, scleroderma antibody positive.  Follows up with rheumatologist in [place].  No acute issues  Stop aspirin with eliquis on board now.     Gastroesophageal reflux disease.  Continue PTA omeprazole.     Obesity with a BMI greater than 30.  Consider lifestyle modification with diet and exercise as able to.  Consider sleep studies as outpatient.     Rule Out COVID-19 infection  COVID-19 PCR test result negative    Dispo  Home in AM if stable.    MIGUEL PALUMBO MD   Pg [phone]    DVT Prhylaxis: Low Risk/Ambulatory with no VTE prophylaxis indicated  Code Status: Full Code    ROS:  As described in A/P and Exam.  Otherwise ALL are  negative.    PHYSICAL EXAM:  All vitals have been reviewed    Blood pressure 100/57, pulse 82, temperature 99.7  F (37.6  C), temperature source Oral, resp. rate 24, weight 108.9 kg (240 lb), SpO2 93 %.    No intake/output data recorded.    GENERAL APPEARANCE: healthy, alert and no distress  EYES: conjunctiva clear, eyes grossly normal  HENT: external ears and nose normal   RESP: lungs clear to auscultation - no rales, rhonchi or wheezes  CV: regular rate and rhythm, normal S1 S2, no S3 or S4 and no murmur, click or rub   ABDOMEN: soft, nontender, no HSM or masses and bowel sounds normal  MS: no clubbing, cyanosis; no edema  SKIN: clear without significant rashes or lesions  NEURO: -non-focal moves all 4 extr    ROUTINE  LABS (Last four results)  CMP  Recent Labs   Lab 07/24/21  0555 07/23/21  1150    139   POTASSIUM 3.7 4.4   CHLORIDE 111* 109   CO2 23 26   ANIONGAP 6 4   GLC 99 100*   BUN 13 17   CR 0.76 0.79   GFRESTIMATED 84 80   GUMARO 9.2 9.1   MAG  --  2.3   PROTTOTAL  --  7.5   ALBUMIN  --  3.4   BILITOTAL  --  0.5   ALKPHOS  --  86   AST  --  12   ALT  --   22     CBC  Recent Labs   Lab 07/24/21  0555 07/23/21  1150   WBC 9.7 7.5   RBC 4.66 4.70   HGB 14.1 13.9   HCT 42.6 43.1   MCV 91 92   MCH 30.3 29.6   MCHC 33.1 32.3   RDW 14.1 13.8    258     INRNo lab results found in last 7 days.  Arterial Blood GasNo lab results found in last 7 days.    Recent Results (from the past 24 hour(s))   CT Chest Pulmonary Embolism w Contrast    Narrative    EXAM: CT CHEST PULMONARY EMBOLISM W CONTRAST  LOCATION: [facility]  DATE/TIME: 7/24/2021 6:23 AM    INDICATION: Chest pain and shortness of breath.  COMPARISON: None.  TECHNIQUE: CT chest pulmonary angiogram during arterial phase injection of IV contrast. Multiplanar reformats and MIP reconstructions were performed. Dose reduction techniques were used.   CONTRAST: 80mL Isovue-370    FINDINGS:  ANGIOGRAM CHEST: Pulmonary arteries are normal caliber and negative for pulmonary emboli. Thoracic aorta is negative for dissection. No CT evidence of right heart strain.    LUNGS AND PLEURA: Patchy foci of air trapping throughout the lungs. Interstitial and alveolar infiltrates also present, with central bronchial wall thickening. No pleural effusion.    MEDIASTINUM/AXILLAE: Heart size within normal limits. No pericardial effusion.    CORONARY ARTERY CALCIFICATION: Mild.    UPPER ABDOMEN: Low-density lesion in hepatic segment 4B measuring 1.7 cm is not a simple cyst by density criteria.    MUSCULOSKELETAL: Normal.      Impression    IMPRESSION:  1.  Negative for pulmonary embolism.    2.  Bilateral interstitial and alveolar infiltrates, including thickening of the central bronchial walls. Findings may reflect acute onset of pulmonary edema, though inflammatory pneumonitis could also appear similar.    3.  Scattered foci of air trapping throughout both lungs suggests a component of lower airways obstructive disease.   Echocardiogram Complete   Result Value    LVEF  55-60%    Narrative     [id number]  [id number]  [id number]  [id number]                                                                       Version 2     [facility]  Echocardiography Laboratory  [address] [place] [address]     Name: ROBYN ARCHIBALD  MRN: [id number]  : 1955  Study Date: 2021 07:39 AM  Age: 65 yrs  Gender: Female  Patient Location: [facility]  Reason For Study: Atrial Fibrillation  Ordering Physician: JP PEDRAZA  Performed By: Alvarez Bernard     BSA: 2.2 m2  Height: 66 in  Weight: 241 lb  HR: 123  BP: 110/93 mmHg  ______________________________________________________________________________  Procedure  Complete Portable Echo Adult. Limited Portable Echo Adult.  ______________________________________________________________________________  Interpretation Summary     1. Normal biventricular size and function. Left ventricular ejection fraction  of 55-60%.  2. The left atrium is mildly dilated. The right atrium is mildly dilated.  3. No hemodynamically significant valvular disease.  4. Mildly elevated pulmonary artery pressure.  No prior study for comparison. Technically adequate study.  ______________________________________________________________________________  Left Ventricle  The left ventricle is normal in size. There is normal left ventricular wall  thickness. Left ventricular systolic function is normal. The visual ejection  fraction is 55-60%. Diastolic function not assessed due to atrial  fibrillation. Regional wall motion abnormalities cannot be excluded due to  limited visualization.     Right Ventricle  The right ventricle is normal in size and function.     Atria  The left atrium is mildly dilated. The right atrium is mildly dilated.     Mitral Valve  The mitral valve leaflets appear normal. There is no evidence of stenosis,  fluttering, or prolapse. There is mild mitral annular calcification. There is  trace mitral regurgitation.     Tricuspid  Valve  Normal tricuspid valve. There is trace tricuspid regurgitation. The right  ventricular systolic pressure is approximated at 38.3 mmHg plus the right  atrial pressure. Right ventricular systolic pressure is elevated, consistent  with mild pulmonary hypertension.     Aortic Valve  The aortic valve is not well visualized. There is trace aortic regurgitation.  No aortic stenosis is present.     Pulmonic Valve  The pulmonic valve is not well visualized.     Vessels  Normal size aorta. The ascending aorta is Mildly dilated. IVC diameter and  respiratory changes fall into an intermediate range suggesting an RA pressure  of 8 mmHg.     Pericardium  There is no pericardial effusion.     Rhythm  The rhythm was atrial fibrillation.  ______________________________________________________________________________  MMode/2D Measurements & Calculations     IVSd: 1.4 cm  LVIDd: 4.3 cm  LVIDs: 3.6 cm  LVPWd: 0.95 cm  FS: 15.5 %  LV mass(C)d: 176.6 grams  LV mass(C)dI: 81.6 grams/m2  Ao root diam: 3.3 cm  LA dimension: 4.0 cm  asc Aorta Diam: 3.7 cm  LA/Ao: 1.2  LVOT diam: 2.1 cm  LVOT area: 3.5 cm2  LA Volume (BP): 67.5 ml  LA Volume Index (BP): 31.1 ml/m2  RWT: 0.44     Doppler Measurements & Calculations  MV E max manjinder: 140.0 cm/sec  MV dec slope: 1233 cm/sec2  PA acc time: 0.08 sec  TR max manjinder: 309.5 cm/sec  TR max P.3 mmHg  E/E' av.0  Lateral E/e': 13.1  Medial E/e': 10.8     ______________________________________________________________________________  Report approved by: Elvira Barger 2021 12:10 PM

## 2021-07-24 NOTE — CODE/RAPID RESPONSE
[facility]    RRT Note  7/24/2021   Time Called: 0541    RRT called for: chest pain    Assessment & Plan     Acute onset Chest Pain in the setting on symptomatic paroxysmal atrial fibrillation with RVR  Mild hypoxia  I was called to assess the patient for acute onset 10 out of 10 chest pain with pressure.  She was admitted with atrial fib RVR on 7/23.  She has been on a diltiazem drip at 10 mg/h overnight with heart rates mainly 80s to 90s until this morning when she developed this chest pressure.  The patient describes this as a bandlike sensation that wraps around her upper chest just above her breasts.  She states that she is unable to take deep breaths due to this chest pressure.  She states that she has not had this prior.  2 doses of nitroglycerin were administered without improvement.  EKG does have T wave inversion to the lateral and inferior leads I am unable to compare this to her prior EKG as the images not available.  The patient reports on Thursday she drove in a car for 6 hours to the [facility] from [place].  She denies any prior clot history.  Homans' sign is negative but she does have bilateral lower extremity swelling.  Her O2 saturations are 90 to 92% on room air now which is a decrease from prior.  Due to her recent travel I will assess CT for PE.  The patient's BNP is elevated and her CT is negative for PE but does have evidence of some pulmonary edema.  She will be diuresed with Lasix.  She already has an echo and cardiology consultation pending for today.    INTERVENTIONS:  -CT for PE protocol  -D-dimer, BNP, Troponin now  -EKG completed prior to RRT  -Nitroglycerin x2 doses  -Lasix 40mg IV x1    At the end of the RRT will remain in [facility].    Discussed with and defer further cares to bedside nursing and flying squad    Interval History     Ronel Li is a 65 year old female who was admitted on 7/23/2021 for atrial fibrillation with RVR.    Medical history  significant for: paroxysmal atrial fibrillation not on anticoagulation, asymptomatic SVT on Holter, lupus anticoagulant, statin intolerance, GERD  No past medical history on file.  No past surgical history on file.    Code Status: Full Code    Allergies   Allergies   Allergen Reactions     Penicillins        Physical Exam   Vital Signs with Ranges:  Temp:  [98  F (36.7  C)] 98  F (36.7  C)  Pulse:  [] 130  Resp:  [13-29] 20  BP: ()/() 149/101  SpO2:  [96 %-98 %] 98 %  No intake/output data recorded.    Constitutional: alert, cooperative, mild anxiety  ENT: mucus membranes moist, EOM intact   Neck: supple, ROM intact  Pulmonary: clear, shallow  Cardiovascular: irregularly, irregular rate and rhythm, S1, S2, no murmur  GI: soft  Skin/Integumen: warm, dry  Neuro: alert, no focal deficits  Psych:  Mildly anxious  Extremities: trace edema bilateral lower extremities    Data     EKG:  Interpreted by ANGEL North CNP  Time reviewed: 0545  Symptoms at time of EKG: chest pressure   Rhythm: atrial fibrillation - rapid  Rate: 110-120  Axis: Normal  Ectopy: atrial fibrillation  Conduction: left bundle branch block (incomplete)  ST Segments/ T Waves: T wave inversion Lateral and Inferior  Q Waves: none  Comparison to prior: unable to compare    Clinical Impression: non-specific EKG    ABG:  -No lab results found in last 7 days.    Troponin:    Recent Labs   Lab Test 07/23/21 2035   TROPONIN <0.015       IMAGING: (X-ray/CT/MRI)   No results found for this or any previous visit (from the past 24 hour(s)).    CBC with Diff:  Recent Labs   Lab Test 07/23/21  1150   WBC 7.5   HGB 13.9   MCV 92           Lactic Acid:    No results found for: LACT        Comprehensive Metabolic Panel:  Recent Labs   Lab 07/23/21  1150      POTASSIUM 4.4   CHLORIDE 109   CO2 26   ANIONGAP 4   *   BUN 17   CR 0.79   GFRESTIMATED 80   GUMARO 9.1   MAG 2.3   PROTTOTAL 7.5   ALBUMIN 3.4   BILITOTAL 0.5   ALKPHOS  86   AST 12   ALT 22       INR:    No lab results found.    D-DIMER:  No results found for: DIMER    BNP:  No results found for: BNP    UA:  No results for input(s): COLOR, APPEARANCE, URINEGLC, URINEBILI, URINEKETONE, SG, UBLD, URINEPH, PROTEIN, UROBILINOGEN, NITRITE, LEUKEST, RBCU, WBCU in the last 168 hours.      Time Spent on this Encounter   I spent 40 minutes of critical care time on the unit/floor managing the care of Ronel Li. Upon evaluation, this patient had a high probability of imminent or life-threatening deterioration due to chest pressure with mild hypoxia, which required my direct attention, intervention, and personal management. 100% of my time was spent at the bedside counseling the patient and/or coordinating care regarding services listed in this note.    ANGEL North CNP

## 2021-07-24 NOTE — PLAN OF CARE
Patient arrived to floor at 1645 with diltiazem infusing @ 5mg/hr and heparin infusing @ 1200 units/hr. -130's afib RVR. Diltiazem increased to 10mg/hr with Afib CVR. Up with SBA. Trace edema to BLE. Lung sounds are clear.  Plan for cardiology consult and echo tomorrow.

## 2021-07-24 NOTE — PLAN OF CARE
Neuro- A&Ox4  Most Recent Vitals- Temp: 99.2  F (37.3  C) Temp src: Oral BP: 109/69 Pulse: 58   Resp: 21 SpO2: 93 % O2 Device: None (Room air)   Tele/Cardiac- Afib RVR, converted to SR at 0830  Resp- Had SOB and chest pressure at 0500, RRT was called. LS clear SOB improved  Activity- A1  Pain- CP at 0500, improved   Drips- hep.  Drains/Tubes- 2 PIV  Skin- trace peripheral edema present  GI/- WNL  Aggression Color- Green  COVID status- Negative  Plan- discharge plan pending cards consult  Misc-     Kristin Huang, RN

## 2021-07-24 NOTE — CONSULTS
Electrophysiology/ Cardiology- Consult Note         H&P and Plan:     Reason for consult: Afib with RVR.    History of present illness: 65-year old lady with a history of hypertension, hyperlipidemia and lupus anticoagulant antibody/scleroderma psoriasis, who presents with palpitation was found to have A. fib with RVR.  EP was consulted to help with management.      Patient presents with acute onset of palpitation/tachycardia.  She was evaluated in the ED an EKG confirming A. fib with RVR.  Labs including TSH were within normal limits.  She was started on heparin and diltiazem drip. This morning, she converted back to normal rhythm.    At the moment, she is doing well and is asymptomatic.  She denies any sense of chest pain, shortness of breath, lightheadedness, near-syncope or syncope.    Telemetry shows normal sinus rhythm.  Baseline EKG shows underlying left bundle branch block, which appears to be chronic for her.    Previous studies:  -Echocardiogram:  Normal LV function.  EF of 55-60%.  No significant valve disease.      Plan:  1.  Paroxysmal atrial fibrillation.  She is back in normal rhythm.  She was started on beta-blockers.  I agree with current medical therapy.  2.  Embolic prevention.  Chads vas score of 2.  May stop heparin and start Eliquis.      She may go home later today or tomorrow morning.    Saul Davis MD    Physical Exam:  Vitals: /70   Pulse 78   Temp 99.2  F (37.3  C) (Oral)   Resp 26   Wt 108.9 kg (240 lb)   SpO2 93%       Intake/Output Summary (Last 24 hours) at 7/24/2021 1513  Last data filed at 7/24/2021 1122  Gross per 24 hour   Intake 200 ml   Output 1200 ml   Net -1000 ml     Vitals:    07/23/21 1337 07/23/21 1648 07/24/21 0314   Weight: 109.7 kg (241 lb 12.8 oz) 92.8 kg (204 lb 8 oz) 108.9 kg (240 lb)       Constitutional:  AAO x3.  Pt is in NAD.  HEAD: Normocephalic.  SKIN: Skin normal color, texture and turgor with no lesions or eruptions.  Eyes: PERRL, EOMI.  ENT:   Supple, normal JVP. No lymphadenopathy or thyroid enlargement.  Chest:  CTAB.  Cardiac: RRR, normal  S1 and S2.    Systolic murmur in LLSB II/VI.  Abdomen:  Normal BS.  Soft, non-tender and non-distended.  No rebound or guarding.    Extremities:  Pedious pulses palpable B/L.  No LE edema noticed.   Neurological: Strength and sensation grossly symmetric and intact throughout.         Review of Systems:  Complete review of system is otherwise negative with the exception of what was described above.     CURRENT MEDICATIONS:    aspirin - buffered  325 mg Oral Daily     metoprolol tartrate  25 mg Oral BID     nitroGLYcerin         omeprazole  40 mg Oral Daily     PRN Meds: acetaminophen **OR** acetaminophen, melatonin, naloxone **OR** naloxone **OR** naloxone **OR** naloxone, nitroGLYcerin, ondansetron **OR** ondansetron, oxyCODONE, polyethylene glycol    ALLERGIES     Allergies   Allergen Reactions     Penicillins        PAST MEDICAL HISTORY:  No past medical history on file.    PAST SURGICAL HISTORY:  No past surgical history on file.    FAMILY HISTORY:  No family history on file.    SOCIAL HISTORY:  Social History     Socioeconomic History     Marital status:      Spouse name: Not on file     Number of children: Not on file     Years of education: Not on file     Highest education level: Not on file   Occupational History     Not on file   Tobacco Use     Smoking status: Not on file   Substance and Sexual Activity     Alcohol use: Not on file     Drug use: Not on file     Sexual activity: Not on file   Other Topics Concern     Not on file   Social History Narrative     Not on file     Social Determinants of Health     Financial Resource Strain:      Difficulty of Paying Living Expenses:    Food Insecurity:      Worried About Running Out of Food in the Last Year:      Ran Out of Food in the Last Year:    Transportation Needs:      Lack of Transportation (Medical):      Lack of Transportation (Non-Medical):     Physical Activity:      Days of Exercise per Week:      Minutes of Exercise per Session:    Stress:      Feeling of Stress :    Social Connections:      Frequency of Communication with Friends and Family:      Frequency of Social Gatherings with Friends and Family:      Attends Church Services:      Active Member of Clubs or Organizations:      Attends Club or Organization Meetings:      Marital Status:    Intimate Partner Violence:      Fear of Current or Ex-Partner:      Emotionally Abused:      Physically Abused:      Sexually Abused:          Recent Lab Results:  Recent Labs   Lab 07/24/21  1022 07/24/21  0619 07/24/21  0555 07/23/21  1150   WBC  --   --  9.7 7.5   HGB  --   --  14.1 13.9   MCV  --   --  91 92   PLT  --   --  225 258   NA  --   --  140 139   POTASSIUM  --   --  3.7 4.4   CHLORIDE  --   --  111* 109   CO2  --   --  23 26   BUN  --   --  13 17   CR  --   --  0.76 0.79   ANIONGAP  --   --  6 4   GUMARO  --   --  9.2 9.1   GLC  --   --  99 100*   ALBUMIN  --   --   --  3.4   PROTTOTAL  --   --   --  7.5   BILITOTAL  --   --   --  0.5   ALKPHOS  --   --   --  86   ALT  --   --   --  22   AST  --   --   --  12   TROPONIN <0.015 <0.015 <0.015 <0.015

## 2021-07-24 NOTE — PROVIDER NOTIFICATION
MD Notification    Notified Person: MD    Notified Person Name: Dr Cadena    Notification Date/Time: 7/24/2021    Notification Interaction: orders obtained    Purpose of Notification:    Pt complaining of 10/10 chest pain/pressure with SOB  Ekg taken. no PRNs available. can i get NTG or something else for pain  Thanks  Kristin *[phone]    Orders Received: NTG PRN    Comments:

## 2021-07-25 VITALS
SYSTOLIC BLOOD PRESSURE: 129 MMHG | HEART RATE: 65 BPM | WEIGHT: 233.9 LBS | DIASTOLIC BLOOD PRESSURE: 88 MMHG | RESPIRATION RATE: 16 BRPM | TEMPERATURE: 99.1 F | OXYGEN SATURATION: 97 %

## 2021-07-25 LAB — UFH PPP CHRO-ACNC: >1.1 IU/ML

## 2021-07-25 PROCEDURE — 99213 OFFICE O/P EST LOW 20 MIN: CPT | Performed by: INTERNAL MEDICINE

## 2021-07-25 PROCEDURE — G0378 HOSPITAL OBSERVATION PER HR: HCPCS

## 2021-07-25 PROCEDURE — 36415 COLL VENOUS BLD VENIPUNCTURE: CPT | Performed by: HOSPITALIST

## 2021-07-25 PROCEDURE — 85520 HEPARIN ASSAY: CPT | Performed by: HOSPITALIST

## 2021-07-25 PROCEDURE — 99217 PR OBSERVATION CARE DISCHARGE: CPT | Performed by: INTERNAL MEDICINE

## 2021-07-25 PROCEDURE — 250N000013 HC RX MED GY IP 250 OP 250 PS 637: Performed by: HOSPITALIST

## 2021-07-25 PROCEDURE — 250N000013 HC RX MED GY IP 250 OP 250 PS 637: Performed by: INTERNAL MEDICINE

## 2021-07-25 PROCEDURE — 99207 PR CDG-CODE CATEGORY CHANGED: CPT | Performed by: INTERNAL MEDICINE

## 2021-07-25 RX ORDER — METOPROLOL TARTRATE 25 MG/1
25 TABLET, FILM COATED ORAL 2 TIMES DAILY
Qty: 60 TABLET | Refills: 0 | Status: SHIPPED | OUTPATIENT
Start: 2021-07-25

## 2021-07-25 RX ADMIN — APIXABAN 5 MG: 5 TABLET, FILM COATED ORAL at 06:17

## 2021-07-25 RX ADMIN — METOPROLOL TARTRATE 25 MG: 25 TABLET, FILM COATED ORAL at 08:34

## 2021-07-25 RX ADMIN — OMEPRAZOLE 40 MG: 20 CAPSULE, DELAYED RELEASE ORAL at 08:34

## 2021-07-25 NOTE — DISCHARGE SUMMARY
[facility] Discharge Summary    Ronel Li MRN# [id number]   Age: 65 year old YOB: 1955     Date of Admission:  7/23/2021  Date of Discharge::  7/25/2021  Admitting Physician:  No admitting provider for patient encounter.  Discharge Physician:  Sherif Mark MD  Primary Physician: No Ref-Primary, Physician  Transferring Facility: N/A     Home clinic: unknown          Admission Diagnoses:   Atrial fibrillation with rapid ventricular response (H) [I48.91]          Discharge Diagnosis:     Principle diagnosis: Symptomatic paroxysmal atrial fibrillation with RVR  Secondary diagnoses:  Patient Active Problem List   Diagnosis     Atrial fibrillation with rapid ventricular response (H)          Brief History of Presenting Illness:   As per admit hx  Ronel Li is a 65 year old  female with medical history of paroxysmal atrial fibrillation not on anticoagulation, asymptomatic SVT on Holter, lupus anticoagulant, statin intolerance, GERD presented to the ED with the palpitation.     Patient reports on and off palpitations from her atrial fibrillation, approximately 1-2 times a month.  This morning was working out, noted that her Apple Watch indicated a heart rate that was going up and down.  Associated palpitations prompting her to seek medical attention. Reports from [facility] to celebrate her mother's 90th birthday.   Denies any chest pain or shortness of breath.         Hospital Course:   Symptomatic paroxysmal atrial fibrillation with RVR  Patient reports on and off palpitations from her atrial fibrillation, approximately 1-2 times a month.  This morning was working out, noted that her Apple Watch indicated a heart rate that was going up and down.  Associated palpitations prompting her to seek medical attention. Reports from [facility] to celebrate her mother's 90th birthday.   Has Established cardiac care in [place]. Holter 10/25/2018 - Atrial runs were noted in the form of  supraventricular tachycardia. Echo 10/25/2018 -EF 63%.  Trivial to mild mitral valve regurgitation.  Was started  on IV Cardizem/ heparin drip, received 2 doses of IV metoprolol in ED.  Stopped cardizem gtt and started po metoprolol this AM. Pt converted to SR and is in rate of 60-70s now. Echocardiogram:  Normal LV function.  EF of 55-60%.  No significant valve disease.    Cards seen. Chads vas score of 2. eliquis started/stopped heparin  -continue metoprolol/ eliquis on discharge.     Hypertension  Stable  -continue metoprolol. Stop  Spironolactone.    Low grade fever  Had temp of 100.0 x 1 yesterday. No sx of UTI, no cough or any other sx. ? Stress related. Discussed with pt. Would return to ED if any temps>100.0     Hyperlipidemia.  LDL of 144 in April 2021 on review of records in Care Everywhere.  History of intolerance to statin therapy.  Defer care to PCP, primary cardiologist.  Consider lifestyle modification, hold fish oil supplements while under observation status.     Moderate - severe alcohol use.   Does admit to drinking a little more than her usual since the beginning of the pandemic.  Reports drinks 1 to 2 glasses of wine every day, 4 to 5 glasses of wine over the weekend.  Last night had drank a full bottle of wine.   Discussed to consider cutting down to 1 alcoholic drink per day.  Is motivated to cut down      History of lupus anticoagulant, scleroderma antibody positive.  Follows up with rheumatologist in [place].  No acute issues  Stop aspirin with eliquis on board now.     Gastroesophageal reflux disease.  Continue PTA omeprazole.     Obesity with a BMI greater than 30.  Consider lifestyle modification with diet and exercise as able to.  Consider sleep studies as outpatient.     Rule Out COVID-19 infection  COVID-19 PCR test result negative     Dispo  Home today.          Procedures:   No procedures performed during this admission         Allergies:      Allergies   Allergen Reactions      Penicillins              Medications Prior to Admission:     Medications Prior to Admission   Medication Sig Dispense Refill Last Dose     cetirizine (ZYRTEC) 10 MG tablet Take 10 mg by mouth daily   7/22/2021 at AM     fish oil-omega-3 fatty acids 1000 MG capsule Take 1 g by mouth daily   7/22/2021 at AM     omeprazole (PRILOSEC) 40 MG DR capsule Take 40 mg by mouth daily   7/22/2021 at AM     [DISCONTINUED] aspirin - buffered (ASCRIPTIN) 325 MG TABS tablet Take 325 mg by mouth daily   7/22/2021 at AM     [DISCONTINUED] spironolactone (ALDACTONE) 50 MG tablet Take 50 mg by mouth daily   7/22/2021 at AM             Discharge Medications:     Current Discharge Medication List      START taking these medications    Details   apixaban ANTICOAGULANT (ELIQUIS) 5 MG tablet Take 1 tablet (5 mg) by mouth 2 times daily  Qty: 60 tablet, Refills: 0    Associated Diagnoses: Atrial fibrillation with rapid ventricular response (H)      metoprolol tartrate (LOPRESSOR) 25 MG tablet Take 1 tablet (25 mg) by mouth 2 times daily  Qty: 60 tablet, Refills: 0    Associated Diagnoses: Atrial fibrillation with rapid ventricular response (H)         CONTINUE these medications which have NOT CHANGED    Details   cetirizine (ZYRTEC) 10 MG tablet Take 10 mg by mouth daily      fish oil-omega-3 fatty acids 1000 MG capsule Take 1 g by mouth daily      omeprazole (PRILOSEC) 40 MG DR capsule Take 40 mg by mouth daily         STOP taking these medications       aspirin - buffered (ASCRIPTIN) 325 MG TABS tablet Comments:   Reason for Stopping:         spironolactone (ALDACTONE) 50 MG tablet Comments:   Reason for Stopping:                     Consultations:   Consultation during this admission received from cardiology            Discharge Exam:   Blood pressure 129/88, pulse 65, temperature 99.1  F (37.3  C), temperature source Oral, resp. rate 16, weight 106.1 kg (233 lb 14.4 oz), SpO2 97 %.  GENERAL APPEARANCE: healthy, alert and no  distress  EYES: conjunctiva clear, eyes grossly normal  HENT: external ears and nose normal   NECK: supple, no masses or adenopathy  RESP: lungs clear to auscultation - no rales, rhonchi or wheezes  CV: regular rate and rhythm, normal S1 S2, no S3 or S4 and no murmur, click or rub   ABDOMEN: soft, nontender, no HSM or masses and bowel sounds normal  MS: no clubbing, cyanosis; no edema  SKIN: clear without significant rashes or lesions  NEURO: Normal strength and tone, sensory exam grossly normal, mentation intact and speech normal    Unresulted Labs Ordered in the Past 30 Days of this Admission     No orders found from 6/23/2021 to 7/24/2021.          No results found for this or any previous visit (from the past 24 hour(s)).         Pending Tests at Discharge:   None         Discharge Instructions and Follow-Up:     Discharge diet: Regular   Discharge activity: Activity as tolerated   Discharge follow-up: Follow up with primary care provider in 1-2 weeks  F/u [facility] in [place] as scheduled           Discharge Disposition:     Discharged to home      Attestation:  I have reviewed today's vital signs, notes, medications, labs and imaging.    Time Spent on this Encounter   I, Sherif Mark MD, personally saw the patient today and spent greater than 30 minutes discharging this patient.    Sherif Mark MD

## 2021-07-25 NOTE — PROGRESS NOTES
EP- Cardiology Progress Note           Assessment and Plan:     65-yo F with a Hx of HTN, HL, and lupus anticoagulant antibody/scleroderma, who p/w A. fib with RVR.  She is back in [facility].       Events: No events overnight.      Previous studies:  -Echocardiogram:  Normal LV function.  EF of 55-60%.  No significant valve disease.       Plan:  1.  Paroxysmal atrial fibrillation.  She is tolerating beta-blockers well.    Continue current medical therapy.    2.  Embolic prevention.  Chads vas score of 2.    Continue Eliquis.  We will follow-up with her cardiologist in [place].    We will sign off.  Please call with questions.    Physical Exam:  Vitals: /63 (BP Location: Right arm)   Pulse 77   Temp 98.5  F (36.9  C) (Oral)   Resp 18   Wt 106.1 kg (233 lb 14.4 oz)   SpO2 96%       Intake/Output Summary (Last 24 hours) at 7/25/2021 0843  Last data filed at 7/25/2021 0754  Gross per 24 hour   Intake 540 ml   Output 1200 ml   Net -660 ml     Vitals:    07/23/21 1337 07/23/21 1648 07/24/21 0314 07/25/21 0400   Weight: 109.7 kg (241 lb 12.8 oz) 92.8 kg (204 lb 8 oz) 108.9 kg (240 lb) 106.1 kg (233 lb 14.4 oz)       Constitutional:  AAO x3.  Pt is in NAD.  HEAD: Normocephalic.  SKIN: Skin normal color, texture and turgor with no lesions or eruptions.  Eyes: PERRL, EOMI.  ENT:  Supple, normal JVP. No lymphadenopathy or thyroid enlargement.  Chest:  CTAB.  Cardiac:  RRR, normal  S1 and S2.  No murmurs rubs or gallop.    Abdomen:  Normal BS.  Soft, non-tender and non-distended.  No rebound or guarding.    Extremities:  Pedious pulses palpable B/L.  No LE edema noticed.   Neurological: Strength and sensation grossly symmetric and intact throughout.                            Review of Systems:   As per subjective, otherwise 5 systems reviewed and negative.         Medications:          apixaban ANTICOAGULANT  5 mg Oral BID     metoprolol tartrate  25 mg Oral BID     omeprazole  40 mg Oral Daily     PRN Meds: acetaminophen  **OR** acetaminophen, melatonin, naloxone **OR** naloxone **OR** naloxone **OR** naloxone, nitroGLYcerin, ondansetron **OR** ondansetron, oxyCODONE, polyethylene glycol             Data:     Recent Labs   Lab 07/24/21  1022 07/24/21  0619 07/24/21  0555 07/23/21  1150   WBC  --   --  9.7 7.5   HGB  --   --  14.1 13.9   MCV  --   --  91 92   PLT  --   --  225 258   NA  --   --  140 139   POTASSIUM  --   --  3.7 4.4   CHLORIDE  --   --  111* 109   CO2  --   --  23 26   BUN  --   --  13 17   CR  --   --  0.76 0.79   ANIONGAP  --   --  6 4   GUMARO  --   --  9.2 9.1   GLC  --   --  99 100*   ALBUMIN  --   --   --  3.4   PROTTOTAL  --   --   --  7.5   BILITOTAL  --   --   --  0.5   ALKPHOS  --   --   --  86   ALT  --   --   --  22   AST  --   --   --  12   TROPONIN <0.015 <0.015 <0.015 <0.015

## 2021-07-25 NOTE — PLAN OF CARE
A&Ox4, VSS, on RA. Up IND. Tele: SR, PIV SL, reg diet, transitioned to Eliquis on previous shift. Cont B/B, denies pain, takes pills whole w water. Pt had a temp of 100.6 @ 8pm PRN tylenol given. Plan for discharge today on eliquis and metoprolol. Continue to monitor.

## 2021-07-25 NOTE — PROVIDER NOTIFICATION
"MD Notification    Notified Person: MD    Notified Person Name: Dr. Sherif Mark    Notification Date/Time: 7/25 07:25    Notification Interaction: Web page     Purpose of Notification: \"Critical lab result of Hep10a >1.10\"    Orders Received: No response at this time. Care transferred over to day nurse with update on critical lab result. Continue to monitor.     Comments:    "

## 2021-07-25 NOTE — CONSULTS
Care Management Initial Consult    General Information  Assessment completed with: VM-chart review,    Type of CM/SW Visit: Initial Assessment    Primary Care Provider verified and updated as needed: No   Readmission within the last 30 days: no previous admission in last 30 days      Reason for Consult: discharge planning  Advance Care Planning:            Communication Assessment  Patient's communication style:               Cognitive  Cognitive/Neuro/Behavioral: WDL                      Living Environment:   People in home: spouse  Jay  Current living Arrangements: house      Able to return to prior arrangements: yes       Family/Social Support:  Care provided by: self  Provides care for: no one  Marital Status:     Jay       Description of Support System: Supportive, Involved    Support Assessment: Adequate family and caregiver support, Adequate social supports    Current Resources:   Patient receiving home care services:       Community Resources:    Equipment currently used at home:    Supplies currently used at home:      Employment/Financial:  Employment Status:          Financial Concerns:             Lifestyle & Psychosocial Needs:  Social Determinants of Health     Tobacco Use:      Smoking Tobacco Use:      Smokeless Tobacco Use:    Alcohol Use:      Frequency of Alcohol Consumption:      Average Number of Drinks:      Frequency of Binge Drinking:    Financial Resource Strain:      Difficulty of Paying Living Expenses:    Food Insecurity:      Worried About Running Out of Food in the Last Year:      Ran Out of Food in the Last Year:    Transportation Needs:      Lack of Transportation (Medical):      Lack of Transportation (Non-Medical):    Physical Activity:      Days of Exercise per Week:      Minutes of Exercise per Session:    Stress:      Feeling of Stress :    Social Connections:      Frequency of Communication with Friends and Family:      Frequency of Social Gatherings with Friends  and Family:      Attends Ca[employer] Services:      Active Member of Clubs or Organizations:      Attends Club or Organization Meetings:      Marital Status:    Intimate Partner Violence:      Fear of Current or Ex-Partner:      Emotionally Abused:      Physically Abused:      Sexually Abused:    Depression:      PHQ-2 Score:    Housing Stability:      Unable to Pay for Housing in the Last Year:      Number of Places Lived in the Last Year:      Unstable Housing in the Last Year:        Functional Status:  Prior to admission patient needed assistance:              Mental Health Status:          Chemical Dependency Status:                Values/Beliefs:  Spiritual, Cultural Beliefs, Caodaism Practices, Values that affect care:                 Additional Information:  Received consult for discharge planning. After speaking with charge RN no discharge needs are identified.Annetta will be returning to [place] to follow up with her primary team. Please order new consult if needs arise.     TIFFANIE Smith

## 2021-07-30 LAB
ATRIAL RATE - MUSE: 144 BPM
DIASTOLIC BLOOD PRESSURE - MUSE: NORMAL MMHG
INTERPRETATION ECG - MUSE: NORMAL
P AXIS - MUSE: NORMAL DEGREES
PR INTERVAL - MUSE: NORMAL MS
QRS DURATION - MUSE: 120 MS
QT - MUSE: 356 MS
QTC - MUSE: 500 MS
R AXIS - MUSE: 38 DEGREES
SYSTOLIC BLOOD PRESSURE - MUSE: NORMAL MMHG
T AXIS - MUSE: 198 DEGREES
VENTRICULAR RATE- MUSE: 119 BPM

## 2021-12-25 ENCOUNTER — HEALTH MAINTENANCE LETTER (OUTPATIENT)
Age: 66
End: 2021-12-25

## 2022-10-22 ENCOUNTER — HEALTH MAINTENANCE LETTER (OUTPATIENT)
Age: 67
End: 2022-10-22

## 2023-04-01 ENCOUNTER — HEALTH MAINTENANCE LETTER (OUTPATIENT)
Age: 68
End: 2023-04-01

## 2024-01-14 ENCOUNTER — HEALTH MAINTENANCE LETTER (OUTPATIENT)
Age: 69
End: 2024-01-14

## 2024-06-02 ENCOUNTER — HEALTH MAINTENANCE LETTER (OUTPATIENT)
Age: 69
End: 2024-06-02